# Patient Record
Sex: MALE | Race: WHITE
[De-identification: names, ages, dates, MRNs, and addresses within clinical notes are randomized per-mention and may not be internally consistent; named-entity substitution may affect disease eponyms.]

---

## 2019-06-23 ENCOUNTER — HOSPITAL ENCOUNTER (EMERGENCY)
Dept: HOSPITAL 11 - JP.ED | Age: 74
Discharge: HOME | End: 2019-06-23
Payer: MEDICARE
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
## 2019-06-23 DIAGNOSIS — S20.361A: Primary | ICD-10-CM

## 2019-06-23 DIAGNOSIS — M19.90: ICD-10-CM

## 2019-06-23 PROCEDURE — 99282 EMERGENCY DEPT VISIT SF MDM: CPT

## 2019-06-23 NOTE — EDM.PDOC
ED HPI GENERAL MEDICAL PROBLEM





- General


Chief Complaint: Bite:Animal, Insect


Stated Complaint: WOOD TICK


Time Seen by Provider: 06/23/19 20:30


Source of Information: Reports: Patient


History Limitations: Reports: No Limitations





- History of Present Illness


INITIAL COMMENTS - FREE TEXT/NARRATIVE: 





pt arrived with a woodtick embedded in the rt axilla area. part of it was 

removed the head remained. This was definitely a deertick.  The area around the 

site was very red. 


Onset: Other (pt took the tick off today and part was left in the site. )


Duration: Hour(s):


Location: Reports: Chest


Associated Symptoms: Reports: No Other Symptoms





- Related Data


 Allergies











Allergy/AdvReac Type Severity Reaction Status Date / Time


 


No Known Allergies Allergy   Verified 06/23/19 20:30











Home Meds: 


 Home Meds





Simvastatin [Zocor] 10 mg PO DAILY 06/23/19 [History]


Tamsulosin [Flomax] 0.4 mg PO DAILY 06/23/19 [History]











Past Medical History


HEENT History: Reports: Impaired Vision


Cardiovascular History: Reports: High Cholesterol


Musculoskeletal History: Reports: Arthritis





- Past Surgical History


GI Surgical History: Reports: Colon





Social & Family History





- Tobacco Use


Smoking Status *Q: Never Smoker





- Caffeine Use


Caffeine Use: Reports: Coffee, Soda, Tea


Caffeine Use Comment: daily





- Recreational Drug Use


Recreational Drug Use: No





ED ROS GENERAL





- Review of Systems


Review Of Systems: See Below


Constitutional: Reports: No Symptoms


HEENT: Reports: No Symptoms


Respiratory: Reports: No Symptoms


Cardiovascular: Reports: No Symptoms


Endocrine: Reports: No Symptoms


GI/Abdominal: Reports: No Symptoms


: Reports: No Symptoms


Skin: Reports: Other (pt had a deer tick embedded in his rt axilla area.  This 

was cleansed and with a sliver forceps the rest of the tick was removed. )





ED EXAM, ANIMAL BITE





- Physical Exam


Exam: See Below


Text/Narrative:: 





pt has a deer tick in the rt axilla. the rest of the tick was removed from the 

site. 


Exam Limited By: No Limitations


General Appearance: Alert


Respiratory/Chest: Other ( the site where the tick was stuck was quite red. )





Course





- Vital Signs


Last Recorded V/S: 


 Last Vital Signs











Temp  35.9 C   06/23/19 20:28


 


Pulse  62   06/23/19 20:28


 


Resp  16   06/23/19 20:28


 


BP  174/98 H  06/23/19 20:28


 


Pulse Ox  100   06/23/19 20:28














- Orders/Labs/Meds


Meds: 


Medications














Discontinued Medications














Generic Name Dose Route Start Last Admin





  Trade Name Babak  PRN Reason Stop Dose Admin


 


Doxycycline Hyclate  100 mg  06/23/19 20:42  





  Vibramycin  PO  06/23/19 20:43  





  ONETIME ONE   





     





     





     





     














Departure





- Departure


Time of Disposition: 20:43


Disposition: Home, Self-Care 01


Condition: Fair


Clinical Impression: 


 Tick bite of chest wall








- Discharge Information


Referrals: 


PCP,None [Primary Care Provider] - 


Forms:  ED Department Discharge


Care Plan Goals: 


moist warm packs to the redened area, doxycyline 100mg bid for 3 days.